# Patient Record
Sex: FEMALE | Race: WHITE | NOT HISPANIC OR LATINO | URBAN - METROPOLITAN AREA
[De-identification: names, ages, dates, MRNs, and addresses within clinical notes are randomized per-mention and may not be internally consistent; named-entity substitution may affect disease eponyms.]

---

## 2017-07-25 ENCOUNTER — EMERGENCY (EMERGENCY)
Facility: HOSPITAL | Age: 52
LOS: 0 days | Discharge: HOME | End: 2017-07-25
Attending: EMERGENCY MEDICINE | Admitting: EMERGENCY MEDICINE
Payer: COMMERCIAL

## 2017-07-25 VITALS — WEIGHT: 145.06 LBS | HEIGHT: 70 IN

## 2017-07-25 VITALS
HEART RATE: 75 BPM | OXYGEN SATURATION: 100 % | TEMPERATURE: 98 F | RESPIRATION RATE: 18 BRPM | DIASTOLIC BLOOD PRESSURE: 70 MMHG | SYSTOLIC BLOOD PRESSURE: 114 MMHG

## 2017-07-25 DIAGNOSIS — S09.93XA UNSPECIFIED INJURY OF FACE, INITIAL ENCOUNTER: ICD-10-CM
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
## 2017-07-25 PROCEDURE — 99285 EMERGENCY DEPT VISIT HI MDM: CPT

## 2017-07-25 RX ORDER — IBUPROFEN 200 MG
600 TABLET ORAL ONCE
Qty: 0 | Refills: 0 | Status: COMPLETED | OUTPATIENT
Start: 2017-07-25 | End: 2017-07-25

## 2017-07-25 RX ADMIN — Medication 600 MILLIGRAM(S): at 21:43
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
## 2017-07-25 NOTE — ED STATDOCS - MEDICAL DECISION MAKING DETAILS
Pt presents s/p injury to face c/o HA and neck pain, likely with mild concussion. Will give meds and d/c home. Encourage rest and hydration. Pt presents s/p injury to face c/o HA and neck pain, likely with mild concussion. Will give meds and d/c home. Encourage rest and hydration. valium for neck spasm and motrin for headache

## 2017-07-25 NOTE — ED STATDOCS - CARE PLAN
Principal Discharge DX:	Strain of neck muscle, initial encounter  Secondary Diagnosis:	Head injury due to trauma, initial encounter

## 2017-07-25 NOTE — ED ADULT TRIAGE NOTE - CHIEF COMPLAINT QUOTE
pt was in the store looking at [employer] umbrellas and it fell and hit her head. pt c/o of slight nausea and headache. denies dizziness/vomiting.

## 2017-07-25 NOTE — ED ADULT NURSE NOTE - CHIEF COMPLAINT QUOTE
pt was in the store looking at [employer] umbrellas and it fell and hit her head. pt c/o of slight nausea and headache. denies dizziness/vomiting.

## 2018-07-23 ENCOUNTER — EMERGENCY (EMERGENCY)
Facility: HOSPITAL | Age: 53
LOS: 0 days | Discharge: HOME | End: 2018-07-23
Attending: EMERGENCY MEDICINE | Admitting: EMERGENCY MEDICINE
Payer: COMMERCIAL

## 2018-07-23 VITALS — HEIGHT: 70 IN | WEIGHT: 145.06 LBS

## 2018-07-23 VITALS
HEART RATE: 86 BPM | SYSTOLIC BLOOD PRESSURE: 124 MMHG | RESPIRATION RATE: 18 BRPM | OXYGEN SATURATION: 100 % | DIASTOLIC BLOOD PRESSURE: 91 MMHG | TEMPERATURE: 98 F

## 2018-07-23 DIAGNOSIS — S06.0X9A CONCUSSION WITH LOSS OF CONSCIOUSNESS OF UNSPECIFIED DURATION, INITIAL ENCOUNTER: ICD-10-CM
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
## 2018-07-23 DIAGNOSIS — S09.90XA UNSPECIFIED INJURY OF HEAD, INITIAL ENCOUNTER: ICD-10-CM

## 2018-07-23 PROCEDURE — 70450 CT HEAD/BRAIN W/O DYE: CPT | Mod: 26

## 2018-07-23 PROCEDURE — 72125 CT NECK SPINE W/O DYE: CPT | Mod: 26

## 2018-07-23 PROCEDURE — 99284 EMERGENCY DEPT VISIT MOD MDM: CPT

## 2018-07-23 NOTE — ED STATDOCS - CARE PLAN
Principal Discharge DX:	Closed head injury, initial encounter  Secondary Diagnosis:	Concussion with loss of consciousness, initial encounter
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
## 2018-07-23 NOTE — ED STATDOCS - NS_ ATTENDINGSCRIBEDETAILS _ED_A_ED_FT
I Dennis Flannery MD saw and examined the patient. Scribe documented for me and under my supervision. I have modified the scribe's documentation where necessary to reflect my history, physical exam and other relevant documentations pertinent to the care of the patient.

## 2018-07-23 NOTE — ED STATDOCS - ATTENDING CONTRIBUTION TO CARE
I Dennis Flannery MD saw and examined the patient. MLP saw and examined the patient under my supervision. I discussed the care of the patient with MLP and agree with MLP's plan, assessment and care of the patient while in the ED.

## 2018-07-23 NOTE — ED STATDOCS - MUSCULOSKELETAL, MLM
range of motion is not limited and there is no muscle tenderness. range of motion is not limited and there is no muscle tenderness. NIH=0 GCS=15. FROM x 4.

## 2018-07-23 NOTE — ED STATDOCS - MEDICAL DECISION MAKING DETAILS
51 y/o female presents with head injury today, plan for CT c-spine and head. Pt refuses pain medication at this time. 51 y/o female presents with head injury today, normal neuro exam. Plan for CT c-spine and head. Pt refuses pain medication at this time.

## 2018-07-23 NOTE — ED STATDOCS - DIAGNOSIS COUNSELING, MDM
conducted a detailed discussion... Patient told to follow up with PMD, given education about concussion and educated about symptoms and the need to return immediately if unable to see concussion program, or neurology or if any health concerns.

## 2018-07-23 NOTE — ED STATDOCS - GASTROINTESTINAL, MLM
abdomen soft, non-tender, and non-distended. Bowel sounds present. abdomen soft, non-tender, and non-distended. Bowel sounds present. BS+/ 4 quadrants.

## 2018-07-23 NOTE — ED STATDOCS - NEUROLOGICAL, MLM
sensation is normal and strength is normal. sensation is normal and strength is normal. 5/5 strength all extremities. Cranial nerves ii-vii intact. Finger to nose test normal. sensation is normal and strength is normal. 5/5 strength all extremities. Cranial nerves ii-xii intact. Finger to nose test normal.

## 2019-12-24 ENCOUNTER — EMERGENCY (EMERGENCY)
Facility: HOSPITAL | Age: 54
LOS: 0 days | Discharge: HOME | End: 2019-12-25
Attending: EMERGENCY MEDICINE
Payer: COMMERCIAL

## 2019-12-24 VITALS
HEIGHT: 70 IN | HEART RATE: 88 BPM | RESPIRATION RATE: 18 BRPM | WEIGHT: 145.06 LBS | OXYGEN SATURATION: 100 % | SYSTOLIC BLOOD PRESSURE: 108 MMHG | DIASTOLIC BLOOD PRESSURE: 68 MMHG

## 2019-12-24 DIAGNOSIS — R11.2 NAUSEA WITH VOMITING, UNSPECIFIED: ICD-10-CM

## 2019-12-24 DIAGNOSIS — K52.9 NONINFECTIVE GASTROENTERITIS AND COLITIS, UNSPECIFIED: ICD-10-CM

## 2019-12-24 DIAGNOSIS — R10.31 RIGHT LOWER QUADRANT PAIN: ICD-10-CM

## 2019-12-24 DIAGNOSIS — R19.7 DIARRHEA, UNSPECIFIED: ICD-10-CM

## 2019-12-24 LAB
ALBUMIN SERPL ELPH-MCNC: 4.2 G/DL — SIGNIFICANT CHANGE UP (ref 3.3–5)
ALP SERPL-CCNC: 87 U/L — SIGNIFICANT CHANGE UP (ref 40–120)
ALT FLD-CCNC: 22 U/L — SIGNIFICANT CHANGE UP (ref 12–78)
ANION GAP SERPL CALC-SCNC: 5 MMOL/L — SIGNIFICANT CHANGE UP (ref 5–17)
APPEARANCE UR: CLEAR — SIGNIFICANT CHANGE UP
AST SERPL-CCNC: 19 U/L — SIGNIFICANT CHANGE UP (ref 15–37)
BASOPHILS # BLD AUTO: 0.03 K/UL — SIGNIFICANT CHANGE UP (ref 0–0.2)
BASOPHILS NFR BLD AUTO: 0.2 % — SIGNIFICANT CHANGE UP (ref 0–2)
BILIRUB SERPL-MCNC: 0.6 MG/DL — SIGNIFICANT CHANGE UP (ref 0.2–1.2)
BILIRUB UR-MCNC: NEGATIVE — SIGNIFICANT CHANGE UP
BUN SERPL-MCNC: 17 MG/DL — SIGNIFICANT CHANGE UP (ref 7–23)
CALCIUM SERPL-MCNC: 9.3 MG/DL — SIGNIFICANT CHANGE UP (ref 8.5–10.1)
CHLORIDE SERPL-SCNC: 104 MMOL/L — SIGNIFICANT CHANGE UP (ref 96–108)
CO2 SERPL-SCNC: 28 MMOL/L — SIGNIFICANT CHANGE UP (ref 22–31)
COLOR SPEC: YELLOW — SIGNIFICANT CHANGE UP
CREAT SERPL-MCNC: 0.88 MG/DL — SIGNIFICANT CHANGE UP (ref 0.5–1.3)
DIFF PNL FLD: NEGATIVE — SIGNIFICANT CHANGE UP
EOSINOPHIL # BLD AUTO: 0 K/UL — SIGNIFICANT CHANGE UP (ref 0–0.5)
EOSINOPHIL NFR BLD AUTO: 0 % — SIGNIFICANT CHANGE UP (ref 0–6)
GLUCOSE SERPL-MCNC: 177 MG/DL — HIGH (ref 70–99)
GLUCOSE UR QL: NEGATIVE MG/DL — SIGNIFICANT CHANGE UP
HCT VFR BLD CALC: 44.1 % — SIGNIFICANT CHANGE UP (ref 34.5–45)
HGB BLD-MCNC: 15 G/DL — SIGNIFICANT CHANGE UP (ref 11.5–15.5)
IMM GRANULOCYTES NFR BLD AUTO: 0.3 % — SIGNIFICANT CHANGE UP (ref 0–1.5)
KETONES UR-MCNC: ABNORMAL
LEUKOCYTE ESTERASE UR-ACNC: ABNORMAL
LIDOCAIN IGE QN: 148 U/L — SIGNIFICANT CHANGE UP (ref 73–393)
LYMPHOCYTES # BLD AUTO: 0.59 K/UL — LOW (ref 1–3.3)
LYMPHOCYTES # BLD AUTO: 4.3 % — LOW (ref 13–44)
MCHC RBC-ENTMCNC: 30.4 PG — SIGNIFICANT CHANGE UP (ref 27–34)
MCHC RBC-ENTMCNC: 34 GM/DL — SIGNIFICANT CHANGE UP (ref 32–36)
MCV RBC AUTO: 89.3 FL — SIGNIFICANT CHANGE UP (ref 80–100)
MONOCYTES # BLD AUTO: 0.62 K/UL — SIGNIFICANT CHANGE UP (ref 0–0.9)
MONOCYTES NFR BLD AUTO: 4.5 % — SIGNIFICANT CHANGE UP (ref 2–14)
NEUTROPHILS # BLD AUTO: 12.37 K/UL — HIGH (ref 1.8–7.4)
NEUTROPHILS NFR BLD AUTO: 90.7 % — HIGH (ref 43–77)
NITRITE UR-MCNC: NEGATIVE — SIGNIFICANT CHANGE UP
PH UR: 5 — SIGNIFICANT CHANGE UP (ref 5–8)
PLATELET # BLD AUTO: 215 K/UL — SIGNIFICANT CHANGE UP (ref 150–400)
POTASSIUM SERPL-MCNC: 4.1 MMOL/L — SIGNIFICANT CHANGE UP (ref 3.5–5.3)
POTASSIUM SERPL-SCNC: 4.1 MMOL/L — SIGNIFICANT CHANGE UP (ref 3.5–5.3)
PROT SERPL-MCNC: 7.8 GM/DL — SIGNIFICANT CHANGE UP (ref 6–8.3)
PROT UR-MCNC: 15 MG/DL
RBC # BLD: 4.94 M/UL — SIGNIFICANT CHANGE UP (ref 3.8–5.2)
RBC # FLD: 12.4 % — SIGNIFICANT CHANGE UP (ref 10.3–14.5)
SODIUM SERPL-SCNC: 137 MMOL/L — SIGNIFICANT CHANGE UP (ref 135–145)
SP GR SPEC: 1.02 — SIGNIFICANT CHANGE UP (ref 1.01–1.02)
UROBILINOGEN FLD QL: NEGATIVE MG/DL — SIGNIFICANT CHANGE UP
WBC # BLD: 13.65 K/UL — HIGH (ref 3.8–10.5)
WBC # FLD AUTO: 13.65 K/UL — HIGH (ref 3.8–10.5)

## 2019-12-24 PROCEDURE — 86901 BLOOD TYPING SEROLOGIC RH(D): CPT

## 2019-12-24 PROCEDURE — 96374 THER/PROPH/DIAG INJ IV PUSH: CPT | Mod: XU

## 2019-12-24 PROCEDURE — 81001 URINALYSIS AUTO W/SCOPE: CPT

## 2019-12-24 PROCEDURE — 86900 BLOOD TYPING SEROLOGIC ABO: CPT

## 2019-12-24 PROCEDURE — 74177 CT ABD & PELVIS W/CONTRAST: CPT | Mod: 26

## 2019-12-24 PROCEDURE — 83690 ASSAY OF LIPASE: CPT

## 2019-12-24 PROCEDURE — 36415 COLL VENOUS BLD VENIPUNCTURE: CPT

## 2019-12-24 PROCEDURE — 99284 EMERGENCY DEPT VISIT MOD MDM: CPT | Mod: 25

## 2019-12-24 PROCEDURE — 86850 RBC ANTIBODY SCREEN: CPT

## 2019-12-24 PROCEDURE — 99284 EMERGENCY DEPT VISIT MOD MDM: CPT

## 2019-12-24 PROCEDURE — 87086 URINE CULTURE/COLONY COUNT: CPT

## 2019-12-24 PROCEDURE — 80053 COMPREHEN METABOLIC PANEL: CPT

## 2019-12-24 PROCEDURE — 85025 COMPLETE CBC W/AUTO DIFF WBC: CPT

## 2019-12-24 PROCEDURE — 96361 HYDRATE IV INFUSION ADD-ON: CPT

## 2019-12-24 PROCEDURE — 74177 CT ABD & PELVIS W/CONTRAST: CPT

## 2019-12-24 RX ORDER — FAMOTIDINE 10 MG/ML
20 INJECTION INTRAVENOUS ONCE
Refills: 0 | Status: COMPLETED | OUTPATIENT
Start: 2019-12-24 | End: 2019-12-24

## 2019-12-24 RX ORDER — ONDANSETRON 8 MG/1
4 TABLET, FILM COATED ORAL ONCE
Refills: 0 | Status: COMPLETED | OUTPATIENT
Start: 2019-12-24 | End: 2019-12-24

## 2019-12-24 RX ORDER — SODIUM CHLORIDE 9 MG/ML
1000 INJECTION INTRAMUSCULAR; INTRAVENOUS; SUBCUTANEOUS ONCE
Refills: 0 | Status: COMPLETED | OUTPATIENT
Start: 2019-12-24 | End: 2019-12-24

## 2019-12-24 RX ADMIN — SODIUM CHLORIDE 1000 MILLILITER(S): 9 INJECTION INTRAMUSCULAR; INTRAVENOUS; SUBCUTANEOUS at 22:12

## 2019-12-24 RX ADMIN — SODIUM CHLORIDE 1000 MILLILITER(S): 9 INJECTION INTRAMUSCULAR; INTRAVENOUS; SUBCUTANEOUS at 21:12

## 2019-12-24 NOTE — ED STATDOCS - PATIENT PORTAL LINK FT
You can access the FollowMyHealth Patient Portal offered by [facility] by registering at the following website: [email or web address]. By joining [employer]’s FollowMyHealth portal, you will also be able to view your health information using other applications (apps) compatible with our system.

## 2019-12-24 NOTE — ED STATDOCS - OBJECTIVE STATEMENT
53 y/o female with no significant PMHx presents to the ED c/o RLQ abd pain beginning today afternoon. +N/V/D. Reports that stool is very watery. No other acute complaints.

## 2019-12-24 NOTE — ED STATDOCS - ATTENDING CONTRIBUTION TO CARE
Attending Contribution to Care: I, Cara Ortega, performed the initial face to face bedside interview with this patient regarding history of present illness, review of symptoms and relevant past medical, social and family history.  I completed an independent physical examination.  I was the initial provider who evaluated this patient and the history, physical, and MDM reflect this intial assessment. I have signed out the follow up of any pending tests after the original (i.e. labs, radiological studies) to the ACP with instructions to review any with instructions to review any concerning findings to me prior to discharge.  I have communicated the patient’s plan of care and disposition with the ACP.

## 2019-12-24 NOTE — ED STATDOCS - PHYSICAL EXAMINATION
PA NOTE: GEN: AOX3, NAD. HEENT: Throat clear. Airway is patent. EYES: PERRLA. EOMI. Head: NC/AT. NECK: Supple, No JVD. FROM. C-spine non-tender. CV:S1S2, RRR, LUNGS: CTA b/l, no w/r/r. CHEST: Equal chest expansion and rise. No deformity. ABD: Soft, NT/ND, no rebound, no guarding. No CVAT. NEG Fenton. NEG McBurney's point tenderness. EXT: No e/c/c. 2+ distal pulses. SKIN: No rashes. NEURO: No focal deficits. CN II-XII intact. FROM. 5/5 motor and sensory. ANGELINA Mary

## 2019-12-24 NOTE — ED STATDOCS - PROGRESS NOTE DETAILS
Patient is a 55 y/o female with no significant PMHx presents to [facility] with c/o sudden onset of n/v/d and abd cramps x past 4 hours. DENIES blood in vomit, rectal bleeding, fever, chills. No sick contacts. No GI history in the past. ~ANGELINA Mary Patient examined and evaluated. Will get labs, CT to r/o acute abd. Patient refused pain meds, nausea meds. ~ANGELINA Mary Waiting for results of CT.  Requesting ice chips. Galdino ROTHMAN ct negative tolerating po fluids will d/c ADELIA Higgins DO

## 2019-12-25 VITALS
SYSTOLIC BLOOD PRESSURE: 118 MMHG | DIASTOLIC BLOOD PRESSURE: 68 MMHG | TEMPERATURE: 99 F | OXYGEN SATURATION: 100 % | RESPIRATION RATE: 17 BRPM | HEART RATE: 67 BPM

## 2019-12-25 RX ORDER — ONDANSETRON 8 MG/1
1 TABLET, FILM COATED ORAL
Qty: 12 | Refills: 0
Start: 2019-12-25 | End: 2019-12-27

## 2019-12-25 RX ORDER — KETOROLAC TROMETHAMINE 30 MG/ML
30 SYRINGE (ML) INJECTION ONCE
Refills: 0 | Status: DISCONTINUED | OUTPATIENT
Start: 2019-12-25 | End: 2019-12-25

## 2019-12-25 RX ADMIN — Medication 30 MILLIGRAM(S): at 00:12

## 2019-12-25 RX ADMIN — Medication 30 MILLIGRAM(S): at 00:33

## 2019-12-26 LAB
CULTURE RESULTS: SIGNIFICANT CHANGE UP
SPECIMEN SOURCE: SIGNIFICANT CHANGE UP

## 2020-07-27 ENCOUNTER — EMERGENCY (EMERGENCY)
Facility: HOSPITAL | Age: 55
LOS: 0 days | Discharge: HOME | End: 2020-07-27
Payer: COMMERCIAL

## 2020-07-27 VITALS
RESPIRATION RATE: 16 BRPM | TEMPERATURE: 98 F | SYSTOLIC BLOOD PRESSURE: 97 MMHG | DIASTOLIC BLOOD PRESSURE: 64 MMHG | HEART RATE: 72 BPM | OXYGEN SATURATION: 99 %

## 2020-07-27 DIAGNOSIS — Z20.828 CONTACT WITH AND (SUSPECTED) EXPOSURE TO OTHER VIRAL COMMUNICABLE DISEASES: ICD-10-CM

## 2020-07-27 PROCEDURE — U0003: CPT

## 2020-07-27 PROCEDURE — 99283 EMERGENCY DEPT VISIT LOW MDM: CPT

## 2020-07-27 NOTE — ED STATDOCS - NSFOLLOWUPINSTRUCTIONS_ED_ALL_ED_FT
Pt here for COVID-19 testing. Pt non toxic. Pt was swabbed for COVID-19 in their car in drive through area. [facility] will call pt with results. return precautions given. Home self-quarantine recommended. Pt agrees with plan of  care.

## 2020-07-27 NOTE — ED ADULT TRIAGE NOTE - CHIEF COMPLAINT QUOTE
Patient presents for COVID test, Patient was treated, evaluated and discharged by intake provider. Please see provider's notes for assessment.

## 2020-07-27 NOTE — ED STATDOCS - PATIENT PORTAL LINK FT
You can access the FollowMyHealth Patient Portal offered by [facility] by registering at the following website: [email or web address]. By joining [employer]’s FollowMyHealth portal, you will also be able to view your health information using other applications (apps) compatible with our system.

## 2020-07-28 LAB — SARS-COV-2 RNA SPEC QL NAA+PROBE: SIGNIFICANT CHANGE UP

## 2020-08-17 ENCOUNTER — APPOINTMENT (OUTPATIENT)
Dept: OTOLARYNGOLOGY | Facility: CLINIC | Age: 55
End: 2020-08-17

## 2021-01-17 ENCOUNTER — TRANSCRIPTION ENCOUNTER (OUTPATIENT)
Age: 56
End: 2021-01-17

## 2021-01-21 ENCOUNTER — TRANSCRIPTION ENCOUNTER (OUTPATIENT)
Age: 56
End: 2021-01-21

## 2021-01-23 ENCOUNTER — OUTPATIENT (OUTPATIENT)
Dept: OUTPATIENT SERVICES | Facility: HOSPITAL | Age: 56
LOS: 1 days | End: 2021-01-23
Payer: COMMERCIAL

## 2021-01-23 ENCOUNTER — APPOINTMENT (OUTPATIENT)
Age: 56
End: 2021-01-23
Payer: COMMERCIAL

## 2021-01-23 DIAGNOSIS — Z00.8 ENCOUNTER FOR OTHER GENERAL EXAMINATION: ICD-10-CM

## 2021-01-23 PROCEDURE — 76770 US EXAM ABDO BACK WALL COMP: CPT

## 2021-01-23 PROCEDURE — 76770 US EXAM ABDO BACK WALL COMP: CPT | Mod: 26

## 2021-02-09 ENCOUNTER — APPOINTMENT (OUTPATIENT)
Dept: COLORECTAL SURGERY | Facility: CLINIC | Age: 56
End: 2021-02-09
Payer: COMMERCIAL

## 2021-02-09 VITALS
WEIGHT: 145 LBS | DIASTOLIC BLOOD PRESSURE: 77 MMHG | SYSTOLIC BLOOD PRESSURE: 118 MMHG | RESPIRATION RATE: 14 BRPM | HEIGHT: 70 IN | TEMPERATURE: 98 F | HEART RATE: 91 BPM | BODY MASS INDEX: 20.76 KG/M2

## 2021-02-09 DIAGNOSIS — R10.9 UNSPECIFIED ABDOMINAL PAIN: ICD-10-CM

## 2021-02-09 DIAGNOSIS — K45.8 OTHER SPECIFIED ABDOMINAL HERNIA W/OUT OBSTRUCTION OR GANGRENE: ICD-10-CM

## 2021-02-09 PROCEDURE — 99072 ADDL SUPL MATRL&STAF TM PHE: CPT

## 2021-02-09 PROCEDURE — 99243 OFF/OP CNSLTJ NEW/EST LOW 30: CPT

## 2021-02-13 ENCOUNTER — APPOINTMENT (OUTPATIENT)
Dept: CT IMAGING | Facility: CLINIC | Age: 56
End: 2021-02-13
Payer: COMMERCIAL

## 2021-02-13 ENCOUNTER — OUTPATIENT (OUTPATIENT)
Dept: OUTPATIENT SERVICES | Facility: HOSPITAL | Age: 56
LOS: 1 days | End: 2021-02-13
Payer: COMMERCIAL

## 2021-02-13 DIAGNOSIS — R10.9 UNSPECIFIED ABDOMINAL PAIN: ICD-10-CM

## 2021-02-13 PROCEDURE — 74177 CT ABD & PELVIS W/CONTRAST: CPT | Mod: 26

## 2021-02-13 PROCEDURE — 74177 CT ABD & PELVIS W/CONTRAST: CPT

## 2021-03-16 ENCOUNTER — TRANSCRIPTION ENCOUNTER (OUTPATIENT)
Age: 56
End: 2021-03-16

## 2021-05-28 ENCOUNTER — APPOINTMENT (OUTPATIENT)
Dept: FAMILY MEDICINE | Facility: CLINIC | Age: 56
End: 2021-05-28

## 2021-06-04 NOTE — ADDENDUM
[FreeTextEntry1] : 6/4/21 - On 2/9/21 patient presented to the office for consultation for recurrent abdominal hernia.  She was advised to cancel her surgery scheduled on 2/19/21 in order to have evaluate this hernia; workup included CT A/P. If a hernia is present, then this would have to be corrected before any other surgery. The CT A/P results showed no evidence of hernia, therefore, no further intervention required from colorectal surgery.

## 2021-06-04 NOTE — PHYSICAL EXAM
[Normal Breath Sounds] : Normal breath sounds [Normal Heart Sounds] : normal heart sounds [Normal Rate and Rhythm] : normal rate and rhythm [No Rash or Lesion] : No rash or lesion [Alert] : alert [Oriented to Person] : oriented to person [Oriented to Time] : oriented to time [Oriented to Place] : oriented to place [Calm] : calm [Abdomen Masses] : No abdominal masses [Abdomen Tenderness] : ~T No ~M abdominal tenderness [Tender] : nontender [JVD] : no jugular venous distention  [de-identified] : Left abdominal wall bulge consistent with hernia [de-identified] : Looks well in no distress, of stated age. [de-identified] : pupils equal reactive to light normocephalic atraumatic. [de-identified] : moves all 4 extremities appropriately with 5 over 5 strength

## 2021-06-04 NOTE — CONSULT LETTER
[Dear  ___] : Dear  [unfilled], [Consult Letter:] : I had the pleasure of evaluating your patient, [unfilled]. [Please see my note below.] : Please see my note below. [Consult Closing:] : Thank you very much for allowing me to participate in the care of this patient.  If you have any questions, please do not hesitate to contact me. [Sincerely,] : Sincerely, [FreeTextEntry3] : Ion Mcdermott M.D. FACS, FASCRS

## 2021-06-04 NOTE — REVIEW OF SYSTEMS
[As Noted in HPI] : as noted in HPI [Negative] : Heme/Lymph [FreeTextEntry7] : Abdominal wall bulge left side

## 2021-06-04 NOTE — HISTORY OF PRESENT ILLNESS
[FreeTextEntry1] : Consultation from Dr. Zuleika Burton for recurrent abdominal hernia. 55-year-old female with multiple hernia repairs in the past with no complaints of left abdominal wall bulging. Symptoms have been present for several months of worsening associated with some slight abdominal pain denies any nausea or vomiting

## 2021-06-04 NOTE — ASSESSMENT
[FreeTextEntry1] : 55-year-old female with left abdominal wall buldging consistent with recurrent abdominal wall hernia. Recommend CAT scan abdomen and pelvis to better visualize possible hernia if hernia is present will require surgical repair with mesh.Risk and benefits of the surgery have been discussed which include bleeding, infection, sepsis, multiorgan failure, inadvertent injury including hollow viscus, solid organ, ureter neurovascular and neurological structures, DVT PE, heart attack, stroke, hernias, recurrence of tumor and death.

## 2021-07-20 ENCOUNTER — NON-APPOINTMENT (OUTPATIENT)
Age: 56
End: 2021-07-20

## 2021-08-09 NOTE — ED ADULT NURSE NOTE - NS ED NURSE LEVEL OF CONSCIOUSNESS ORIENTATION
Called pt to discuss benefits of cardiac rehab, no answer, left message on v/m for pt to return my call. Pt has not had a follow up visit with cardiology.   Oriented - self; Oriented - place; Oriented - time

## 2021-08-29 PROBLEM — Z87.442 HISTORY OF KIDNEY STONES: Status: RESOLVED | Noted: 2021-08-29 | Resolved: 2021-08-29

## 2021-08-29 PROBLEM — Z86.010 H/O ADENOMATOUS POLYP OF COLON: Status: RESOLVED | Noted: 2021-08-29 | Resolved: 2021-08-29

## 2021-08-29 PROBLEM — Z82.69 FAMILY HISTORY OF OSTEOARTHRITIS: Status: ACTIVE | Noted: 2021-08-29

## 2021-08-29 PROBLEM — Z82.49 FAMILY HISTORY OF HYPERTENSION: Status: ACTIVE | Noted: 2021-08-29

## 2021-08-29 PROBLEM — Z83.438 FAMILY HISTORY OF HYPERLIPIDEMIA: Status: ACTIVE | Noted: 2021-08-29

## 2021-09-01 ENCOUNTER — APPOINTMENT (OUTPATIENT)
Dept: FAMILY MEDICINE | Facility: CLINIC | Age: 56
End: 2021-09-01
Payer: COMMERCIAL

## 2021-09-01 VITALS
SYSTOLIC BLOOD PRESSURE: 110 MMHG | BODY MASS INDEX: 21.19 KG/M2 | OXYGEN SATURATION: 98 % | DIASTOLIC BLOOD PRESSURE: 60 MMHG | WEIGHT: 148 LBS | HEIGHT: 70 IN | TEMPERATURE: 98.6 F | HEART RATE: 81 BPM

## 2021-09-01 DIAGNOSIS — F41.8 OTHER SPECIFIED ANXIETY DISORDERS: ICD-10-CM

## 2021-09-01 DIAGNOSIS — Z86.010 PERSONAL HISTORY OF COLONIC POLYPS: ICD-10-CM

## 2021-09-01 DIAGNOSIS — Z00.00 ENCOUNTER FOR GENERAL ADULT MEDICAL EXAMINATION W/OUT ABNORMAL FINDINGS: ICD-10-CM

## 2021-09-01 DIAGNOSIS — Z87.891 PERSONAL HISTORY OF NICOTINE DEPENDENCE: ICD-10-CM

## 2021-09-01 DIAGNOSIS — Z82.49 FAMILY HISTORY OF ISCHEMIC HEART DISEASE AND OTHER DISEASES OF THE CIRCULATORY SYSTEM: ICD-10-CM

## 2021-09-01 DIAGNOSIS — Z78.9 OTHER SPECIFIED HEALTH STATUS: ICD-10-CM

## 2021-09-01 DIAGNOSIS — Z83.438 FAMILY HISTORY OF OTHER DISORDER OF LIPOPROTEIN METABOLISM AND OTHER LIPIDEMIA: ICD-10-CM

## 2021-09-01 DIAGNOSIS — G43.909 MIGRAINE, UNSPECIFIED, NOT INTRACTABLE, W/OUT STATUS MIGRAINOSUS: ICD-10-CM

## 2021-09-01 DIAGNOSIS — Z87.442 PERSONAL HISTORY OF URINARY CALCULI: ICD-10-CM

## 2021-09-01 DIAGNOSIS — D05.00 LOBULAR CARCINOMA IN SITU OF UNSPECIFIED BREAST: ICD-10-CM

## 2021-09-01 DIAGNOSIS — Z82.69 FAMILY HISTORY OF OTHER DISEASES OF THE MUSCULOSKELETAL SYSTEM AND CONNECTIVE TISSUE: ICD-10-CM

## 2021-09-01 PROCEDURE — 99396 PREV VISIT EST AGE 40-64: CPT

## 2021-09-01 RX ORDER — ALPRAZOLAM 0.5 MG/1
0.5 TABLET ORAL
Qty: 30 | Refills: 0 | Status: ACTIVE | COMMUNITY
Start: 1900-01-01 | End: 1900-01-01

## 2021-09-01 NOTE — PLAN
[FreeTextEntry1] : Reviewed age-appropriate preventive screening tests with patient. UTD on mammo and colonoscopy although she is not sure if she is due at 3 or 5 yrs so she will check with GI to see. Due for gyn exam and she will sched for near future.\par  \par Shingrix #2 and flu vacc revd and recommended. School starts tomorrow so she defers on Shingrix #2 today but she has a 5 day weekend from school this coming weekend so will get vaccine over that weekend (Rx sent to [facility]). She defers on flu vacc for now. \par \par Cont clean eating and regular exercise/staying as physically active as possible.\par \par Reviewed importance of good self care (eg meditation, yoga, adequate rest, regular exercise, magnesium, clean eating etc). r/b/se Alprazolam revd and she will cont to limit use as much as possible. RF sent. \par \par RF Imitrex for prn use, stay well hydrated, clean eating, stress reduction measures for migraines. \par \par She will get a copy of labs from Dr. Leary's office sent here for review. She defers on need for any other labs at this time (lipids in the past have been fabulous) but if she changes her mind and wishes to do labs at any point she can let me know. \par \par Next PE in 1-2 yrs.

## 2021-09-01 NOTE — PHYSICAL EXAM
[No Acute Distress] : no acute distress [Well Nourished] : well nourished [Well Developed] : well developed [Well-Appearing] : well-appearing [Normal Sclera/Conjunctiva] : normal sclera/conjunctiva [EOMI] : extraocular movements intact [No JVD] : no jugular venous distention [No Lymphadenopathy] : no lymphadenopathy [Supple] : supple [Thyroid Normal, No Nodules] : the thyroid was normal and there were no nodules present [No Respiratory Distress] : no respiratory distress  [No Accessory Muscle Use] : no accessory muscle use [Clear to Auscultation] : lungs were clear to auscultation bilaterally [Normal Rate] : normal rate  [Regular Rhythm] : with a regular rhythm [Normal S1, S2] : normal S1 and S2 [No Murmur] : no murmur heard [No Carotid Bruits] : no carotid bruits [No Varicosities] : no varicosities [Pedal Pulses Present] : the pedal pulses are present [No Edema] : there was no peripheral edema [No Extremity Clubbing/Cyanosis] : no extremity clubbing/cyanosis [Soft] : abdomen soft [Non Tender] : non-tender [Non-distended] : non-distended [No Masses] : no abdominal mass palpated [No HSM] : no HSM [Normal Bowel Sounds] : normal bowel sounds [Normal Posterior Cervical Nodes] : no posterior cervical lymphadenopathy [Normal Anterior Cervical Nodes] : no anterior cervical lymphadenopathy [No Joint Swelling] : no joint swelling [Grossly Normal Strength/Tone] : grossly normal strength/tone [No Rash] : no rash [Coordination Grossly Intact] : coordination grossly intact [No Focal Deficits] : no focal deficits [Normal Gait] : normal gait [Normal Affect] : the affect was normal [Normal Insight/Judgement] : insight and judgment were intact [de-identified] : slender frame

## 2021-09-01 NOTE — HISTORY OF PRESENT ILLNESS
[de-identified] : Her last PE was 5/2020\par \par Her last tetanus shot was 2016 at urgent care\par Shingrix 11/2020 and is due for second dose .\par She had COVID Moderna vaccine series. \par \par Her diet is clean/healthful overall\par Exercises regularly\par \par Her last colonoscopy was 11/2018, rpt due 5 yrs (Dr. Anderson)\par Her last mammogram was Summer 2021\par Her last gyn exam was 1/2020 (Dr. Chaney) and she plans to sched for near future.\par \par She also has h/o LCIS right breast cancer in 2009 and migraine HA. \par \par Saw Dr. Leary Summer 2021 and had extensive labs for fatigue, achiness and all was wnl. She will get those labs sent here and she defers on need for any labs today. \par \par She finds sumatriptan to be very helpful when needed and needs RF. She also has Alprazolam on hand for prn use for situational anxiety and would like RF; she knows to limit use of this med and does so. Has high stress in life as is living with her elderly mom who requires care/oversight etc; she has supportive /family/friends and gets out to exercise/get time to herself.

## 2021-09-01 NOTE — REVIEW OF SYSTEMS
[Negative] : Heme/Lymph [Headache] : headache [Anxiety] : anxiety [Depression] : no depression [de-identified] : occas migraine HA, uses sumatriptan and is effective when needed [de-identified] : situational anxiety, uses Alprazolam sparingly with good effect

## 2021-09-08 ENCOUNTER — APPOINTMENT (OUTPATIENT)
Dept: FAMILY MEDICINE | Facility: CLINIC | Age: 56
End: 2021-09-08

## 2021-10-25 ENCOUNTER — EMERGENCY (EMERGENCY)
Facility: HOSPITAL | Age: 56
LOS: 0 days | Discharge: HOME | End: 2021-10-25
Attending: EMERGENCY MEDICINE
Payer: COMMERCIAL

## 2021-10-25 ENCOUNTER — TRANSCRIPTION ENCOUNTER (OUTPATIENT)
Age: 56
End: 2021-10-25

## 2021-10-25 VITALS
HEART RATE: 85 BPM | SYSTOLIC BLOOD PRESSURE: 115 MMHG | TEMPERATURE: 98 F | OXYGEN SATURATION: 100 % | DIASTOLIC BLOOD PRESSURE: 72 MMHG | RESPIRATION RATE: 18 BRPM

## 2021-10-25 VITALS
TEMPERATURE: 98 F | HEIGHT: 70 IN | OXYGEN SATURATION: 98 % | WEIGHT: 113.98 LBS | RESPIRATION RATE: 18 BRPM | SYSTOLIC BLOOD PRESSURE: 108 MMHG | HEART RATE: 87 BPM | DIASTOLIC BLOOD PRESSURE: 74 MMHG

## 2021-10-25 DIAGNOSIS — R11.0 NAUSEA: ICD-10-CM

## 2021-10-25 DIAGNOSIS — R50.9 FEVER, UNSPECIFIED: ICD-10-CM

## 2021-10-25 DIAGNOSIS — R10.31 RIGHT LOWER QUADRANT PAIN: ICD-10-CM

## 2021-10-25 DIAGNOSIS — N39.0 URINARY TRACT INFECTION, SITE NOT SPECIFIED: ICD-10-CM

## 2021-10-25 LAB
ALBUMIN SERPL ELPH-MCNC: 3.8 G/DL — SIGNIFICANT CHANGE UP (ref 3.3–5)
ALP SERPL-CCNC: 81 U/L — SIGNIFICANT CHANGE UP (ref 40–120)
ALT FLD-CCNC: 22 U/L — SIGNIFICANT CHANGE UP (ref 12–78)
ANION GAP SERPL CALC-SCNC: 3 MMOL/L — LOW (ref 5–17)
APPEARANCE UR: CLEAR — SIGNIFICANT CHANGE UP
APTT BLD: 34 SEC — SIGNIFICANT CHANGE UP (ref 27.5–35.5)
AST SERPL-CCNC: 16 U/L — SIGNIFICANT CHANGE UP (ref 15–37)
BACTERIA # UR AUTO: ABNORMAL
BASOPHILS # BLD AUTO: 0.02 K/UL — SIGNIFICANT CHANGE UP (ref 0–0.2)
BASOPHILS NFR BLD AUTO: 0.4 % — SIGNIFICANT CHANGE UP (ref 0–2)
BILIRUB SERPL-MCNC: 0.3 MG/DL — SIGNIFICANT CHANGE UP (ref 0.2–1.2)
BILIRUB UR-MCNC: NEGATIVE — SIGNIFICANT CHANGE UP
BLD GP AB SCN SERPL QL: SIGNIFICANT CHANGE UP
BUN SERPL-MCNC: 14 MG/DL — SIGNIFICANT CHANGE UP (ref 7–23)
CALCIUM SERPL-MCNC: 9.1 MG/DL — SIGNIFICANT CHANGE UP (ref 8.5–10.1)
CHLORIDE SERPL-SCNC: 107 MMOL/L — SIGNIFICANT CHANGE UP (ref 96–108)
CO2 SERPL-SCNC: 29 MMOL/L — SIGNIFICANT CHANGE UP (ref 22–31)
COLOR SPEC: YELLOW — SIGNIFICANT CHANGE UP
CREAT SERPL-MCNC: 0.73 MG/DL — SIGNIFICANT CHANGE UP (ref 0.5–1.3)
DIFF PNL FLD: ABNORMAL
EOSINOPHIL # BLD AUTO: 0 K/UL — SIGNIFICANT CHANGE UP (ref 0–0.5)
EOSINOPHIL NFR BLD AUTO: 0 % — SIGNIFICANT CHANGE UP (ref 0–6)
EPI CELLS # UR: SIGNIFICANT CHANGE UP
GLUCOSE SERPL-MCNC: 93 MG/DL — SIGNIFICANT CHANGE UP (ref 70–99)
GLUCOSE UR QL: NEGATIVE MG/DL — SIGNIFICANT CHANGE UP
HCT VFR BLD CALC: 39.4 % — SIGNIFICANT CHANGE UP (ref 34.5–45)
HGB BLD-MCNC: 13.4 G/DL — SIGNIFICANT CHANGE UP (ref 11.5–15.5)
IMM GRANULOCYTES NFR BLD AUTO: 0.2 % — SIGNIFICANT CHANGE UP (ref 0–1.5)
INR BLD: 1.02 RATIO — SIGNIFICANT CHANGE UP (ref 0.88–1.16)
KETONES UR-MCNC: NEGATIVE — SIGNIFICANT CHANGE UP
LEUKOCYTE ESTERASE UR-ACNC: ABNORMAL
LYMPHOCYTES # BLD AUTO: 1.4 K/UL — SIGNIFICANT CHANGE UP (ref 1–3.3)
LYMPHOCYTES # BLD AUTO: 27.9 % — SIGNIFICANT CHANGE UP (ref 13–44)
MCHC RBC-ENTMCNC: 30.8 PG — SIGNIFICANT CHANGE UP (ref 27–34)
MCHC RBC-ENTMCNC: 34 GM/DL — SIGNIFICANT CHANGE UP (ref 32–36)
MCV RBC AUTO: 90.6 FL — SIGNIFICANT CHANGE UP (ref 80–100)
MONOCYTES # BLD AUTO: 0.62 K/UL — SIGNIFICANT CHANGE UP (ref 0–0.9)
MONOCYTES NFR BLD AUTO: 12.4 % — SIGNIFICANT CHANGE UP (ref 2–14)
NEUTROPHILS # BLD AUTO: 2.96 K/UL — SIGNIFICANT CHANGE UP (ref 1.8–7.4)
NEUTROPHILS NFR BLD AUTO: 59.1 % — SIGNIFICANT CHANGE UP (ref 43–77)
NITRITE UR-MCNC: NEGATIVE — SIGNIFICANT CHANGE UP
PH UR: 6.5 — SIGNIFICANT CHANGE UP (ref 5–8)
PLATELET # BLD AUTO: 206 K/UL — SIGNIFICANT CHANGE UP (ref 150–400)
POTASSIUM SERPL-MCNC: 4.2 MMOL/L — SIGNIFICANT CHANGE UP (ref 3.5–5.3)
POTASSIUM SERPL-SCNC: 4.2 MMOL/L — SIGNIFICANT CHANGE UP (ref 3.5–5.3)
PROT SERPL-MCNC: 7.3 GM/DL — SIGNIFICANT CHANGE UP (ref 6–8.3)
PROT UR-MCNC: NEGATIVE MG/DL — SIGNIFICANT CHANGE UP
PROTHROM AB SERPL-ACNC: 11.9 SEC — SIGNIFICANT CHANGE UP (ref 10.6–13.6)
RBC # BLD: 4.35 M/UL — SIGNIFICANT CHANGE UP (ref 3.8–5.2)
RBC # FLD: 12.4 % — SIGNIFICANT CHANGE UP (ref 10.3–14.5)
RBC CASTS # UR COMP ASSIST: ABNORMAL /HPF (ref 0–4)
SODIUM SERPL-SCNC: 139 MMOL/L — SIGNIFICANT CHANGE UP (ref 135–145)
SP GR SPEC: 1.01 — SIGNIFICANT CHANGE UP (ref 1.01–1.02)
UROBILINOGEN FLD QL: NEGATIVE MG/DL — SIGNIFICANT CHANGE UP
WBC # BLD: 5.01 K/UL — SIGNIFICANT CHANGE UP (ref 3.8–10.5)
WBC # FLD AUTO: 5.01 K/UL — SIGNIFICANT CHANGE UP (ref 3.8–10.5)
WBC UR QL: ABNORMAL

## 2021-10-25 PROCEDURE — 85025 COMPLETE CBC W/AUTO DIFF WBC: CPT

## 2021-10-25 PROCEDURE — 99284 EMERGENCY DEPT VISIT MOD MDM: CPT | Mod: 25

## 2021-10-25 PROCEDURE — 86850 RBC ANTIBODY SCREEN: CPT

## 2021-10-25 PROCEDURE — 85730 THROMBOPLASTIN TIME PARTIAL: CPT

## 2021-10-25 PROCEDURE — 85610 PROTHROMBIN TIME: CPT

## 2021-10-25 PROCEDURE — 80053 COMPREHEN METABOLIC PANEL: CPT

## 2021-10-25 PROCEDURE — 86900 BLOOD TYPING SEROLOGIC ABO: CPT

## 2021-10-25 PROCEDURE — 99285 EMERGENCY DEPT VISIT HI MDM: CPT

## 2021-10-25 PROCEDURE — 81001 URINALYSIS AUTO W/SCOPE: CPT

## 2021-10-25 PROCEDURE — 86901 BLOOD TYPING SEROLOGIC RH(D): CPT

## 2021-10-25 PROCEDURE — 74177 CT ABD & PELVIS W/CONTRAST: CPT | Mod: 26,MA

## 2021-10-25 PROCEDURE — 74177 CT ABD & PELVIS W/CONTRAST: CPT | Mod: MA

## 2021-10-25 PROCEDURE — 87086 URINE CULTURE/COLONY COUNT: CPT

## 2021-10-25 PROCEDURE — 36415 COLL VENOUS BLD VENIPUNCTURE: CPT

## 2021-10-25 RX ORDER — PHENAZOPYRIDINE HCL 100 MG
1 TABLET ORAL
Qty: 6 | Refills: 0
Start: 2021-10-25 | End: 2021-10-27

## 2021-10-25 RX ORDER — CEPHALEXIN 500 MG
1 CAPSULE ORAL
Qty: 21 | Refills: 0
Start: 2021-10-25 | End: 2021-10-31

## 2021-10-25 RX ORDER — IBUPROFEN 200 MG
1 TABLET ORAL
Qty: 10 | Refills: 0
Start: 2021-10-25 | End: 2021-10-29

## 2021-10-25 NOTE — ED STATDOCS - OBJECTIVE STATEMENT
57 y/o female with hx of hernia repair, presents to the ED c/o RLQ abdominal pain x 2 days. +associated fever, chills, nausea, decrease appetite. Pt was seen at Urgent Care and was sent to the ED. Denies dysuria, vomiting. No sick contacts.

## 2021-10-25 NOTE — ED ADULT TRIAGE NOTE - CHIEF COMPLAINT QUOTE
Pt. to the ED C/O RLQ Abdominal Pain with Diarrhea x 2 days + Chills- Pt. sent from  to R/O Appendicitis -- Denies major medical hx

## 2021-10-25 NOTE — ED STATDOCS - PROGRESS NOTE DETAILS
pt refusing pain meds awaiting ct will continue to follow. pt well appearing. -Tara Quezada PA-C pt aware of ct and lab results will treat for UTI and will fu culture results. pt well appearing on dc. -Tara Quezada PA-C

## 2021-10-25 NOTE — ED STATDOCS - PATIENT PORTAL LINK FT
You can access the FollowMyHealth Patient Portal offered by [facility] by registering at the following website: [email or web address]. By joining [employer]’s FollowMyHealth portal, you will also be able to view your health information using other applications (apps) compatible with our system.

## 2021-10-27 LAB
CULTURE RESULTS: SIGNIFICANT CHANGE UP
SPECIMEN SOURCE: SIGNIFICANT CHANGE UP

## 2021-10-28 NOTE — ED POST DISCHARGE NOTE - DETAILS
Left message to return call to ED. Will send letter via [place]. - Jesus Jacobson PA-C pt called, explained contaminated urine culture to pt, pt is asymptomatic Viridiana Wren PA-C

## 2021-12-11 ENCOUNTER — FORM ENCOUNTER (OUTPATIENT)
Age: 56
End: 2021-12-11

## 2021-12-12 ENCOUNTER — TRANSCRIPTION ENCOUNTER (OUTPATIENT)
Age: 56
End: 2021-12-12

## 2021-12-16 ENCOUNTER — OUTPATIENT (OUTPATIENT)
Dept: INPATIENT UNIT | Facility: HOSPITAL | Age: 56
LOS: 1 days | End: 2021-12-16
Payer: MEDICAID

## 2021-12-16 ENCOUNTER — APPOINTMENT (OUTPATIENT)
Dept: DISASTER EMERGENCY | Facility: HOSPITAL | Age: 56
End: 2021-12-16

## 2021-12-16 VITALS
RESPIRATION RATE: 18 BRPM | OXYGEN SATURATION: 97 % | SYSTOLIC BLOOD PRESSURE: 119 MMHG | WEIGHT: 143.08 LBS | HEART RATE: 95 BPM | HEIGHT: 70 IN | DIASTOLIC BLOOD PRESSURE: 83 MMHG | TEMPERATURE: 98 F

## 2021-12-16 VITALS
RESPIRATION RATE: 18 BRPM | DIASTOLIC BLOOD PRESSURE: 85 MMHG | TEMPERATURE: 98 F | OXYGEN SATURATION: 98 % | SYSTOLIC BLOOD PRESSURE: 124 MMHG | HEART RATE: 90 BPM

## 2021-12-16 DIAGNOSIS — U07.1 COVID-19: ICD-10-CM

## 2021-12-16 PROCEDURE — M0243: CPT

## 2021-12-16 RX ORDER — SODIUM CHLORIDE 9 MG/ML
250 INJECTION INTRAMUSCULAR; INTRAVENOUS; SUBCUTANEOUS
Refills: 0 | Status: COMPLETED | OUTPATIENT
Start: 2021-12-16 | End: 2021-12-16

## 2021-12-16 RX ADMIN — SODIUM CHLORIDE 310 MILLILITER(S): 9 INJECTION INTRAMUSCULAR; INTRAVENOUS; SUBCUTANEOUS at 13:45

## 2021-12-16 NOTE — CHART NOTE - NSCHARTNOTEFT_GEN_A_CORE
CC: Monoclonal Antibody Infusion/COVID 19 Positive on 12/12/21      History: Patient presents for infusion of monoclonal antibody infusion. Patient has been screened and was deemed to be a candidate.    Symptoms/ Criteria: fever, cough, ST, malaise, HA, muscle pain    Exposure: unknown    Vaccination Status: Pfizer    Risk Profile includes: LCIS breast cancer          T(C): 36.6 (12-16-21 @ 13:25), Max: 36.6 (12-16-21 @ 13:25)  HR: 95 (12-16-21 @ 13:25) (95 - 95)  BP: 119/83 (12-16-21 @ 13:25) (119/83 - 119/83)  RR: 18 (12-16-21 @ 13:25) (18 - 18)  SpO2: 97% (12-16-21 @ 13:25) (97% - 97%)      PE:   Appearance: NAD	  HEENT:   Normal oral mucosa.   Lymphatic: No lymphadenopathy  Cardiovascular: Normal S1 S2, No JVD, No murmurs, No edema  Respiratory: Lungs clear to auscultation	  Gastrointestinal:  Soft, Non-tender. No guarding   Skin: warm and dry  Neurologic: Non-focal  Extremities: Normal range of motion.     ASSESSMENT:  Pt is a 56y year old Female Covid +  referred to the infusion center for Monoclonal antibody infusion (Regeneron).        PLAN:  - infusion procedure explained to patient   - Consent for monoclonal antibody infusion obtained   - Risk & benefits discussed/all questions answered  - infuse Regeneron over one hour   - will observe patient for one hour post infusion  and then if stable discharge home with outpt follow up as planned by PMD.    POST INFUSION ASSESSMENT:   DISCHARGE at approximately  1515  hours    - Patient tolerated infusion well denies complaints of chest pain/SOB/dizzines/ palps  - VSS for discharge home  - D/C instructions given/ fact sheet included.  - Patient to follow-up with PCP as needed.

## 2021-12-17 ENCOUNTER — TRANSCRIPTION ENCOUNTER (OUTPATIENT)
Age: 56
End: 2021-12-17

## 2022-01-01 NOTE — ED STATDOCS - NS_EDPROVIDERDISPOUSERTYPE_ED_A_ED
[Responds to sound] : responds to sound [Equal movements] : equal movements [Lifts head] : lifts head Scribe Attestation (For Scribes USE Only)... Attending Attestation (For Attendings USE Only).../Scribe Attestation (For Scribes USE Only)...

## 2022-01-20 PROBLEM — Z01.818 PREOP EXAMINATION: Status: ACTIVE | Noted: 2022-01-20

## 2022-01-21 ENCOUNTER — APPOINTMENT (OUTPATIENT)
Dept: FAMILY MEDICINE | Facility: CLINIC | Age: 57
End: 2022-01-21
Payer: COMMERCIAL

## 2022-01-21 ENCOUNTER — NON-APPOINTMENT (OUTPATIENT)
Age: 57
End: 2022-01-21

## 2022-01-21 VITALS
DIASTOLIC BLOOD PRESSURE: 78 MMHG | WEIGHT: 145 LBS | TEMPERATURE: 97.6 F | SYSTOLIC BLOOD PRESSURE: 118 MMHG | HEIGHT: 70 IN | OXYGEN SATURATION: 97 % | HEART RATE: 89 BPM | BODY MASS INDEX: 20.76 KG/M2

## 2022-01-21 DIAGNOSIS — Z01.818 ENCOUNTER FOR OTHER PREPROCEDURAL EXAMINATION: ICD-10-CM

## 2022-01-21 DIAGNOSIS — K46.9 UNSPECIFIED ABDOMINAL HERNIA W/OUT OBSTRUCTION OR GANGRENE: ICD-10-CM

## 2022-01-21 DIAGNOSIS — R05.9 COUGH, UNSPECIFIED: ICD-10-CM

## 2022-01-21 PROCEDURE — 36415 COLL VENOUS BLD VENIPUNCTURE: CPT

## 2022-01-21 PROCEDURE — 93000 ELECTROCARDIOGRAM COMPLETE: CPT

## 2022-01-21 PROCEDURE — 99213 OFFICE O/P EST LOW 20 MIN: CPT | Mod: 25

## 2022-01-22 ENCOUNTER — APPOINTMENT (OUTPATIENT)
Dept: RADIOLOGY | Facility: CLINIC | Age: 57
End: 2022-01-22
Payer: COMMERCIAL

## 2022-01-22 ENCOUNTER — OUTPATIENT (OUTPATIENT)
Dept: OUTPATIENT SERVICES | Facility: HOSPITAL | Age: 57
LOS: 1 days | End: 2022-01-22
Payer: COMMERCIAL

## 2022-01-22 DIAGNOSIS — Z00.8 ENCOUNTER FOR OTHER GENERAL EXAMINATION: ICD-10-CM

## 2022-01-22 LAB
ALBUMIN SERPL ELPH-MCNC: 4.6 G/DL
ALP BLD-CCNC: 98 U/L
ALT SERPL-CCNC: 13 U/L
ANION GAP SERPL CALC-SCNC: 13 MMOL/L
AST SERPL-CCNC: 17 U/L
BILIRUB SERPL-MCNC: 0.2 MG/DL
BUN SERPL-MCNC: 12 MG/DL
CALCIUM SERPL-MCNC: 10.1 MG/DL
CHLORIDE SERPL-SCNC: 100 MMOL/L
CHOLEST SERPL-MCNC: 172 MG/DL
CO2 SERPL-SCNC: 28 MMOL/L
CREAT SERPL-MCNC: 0.81 MG/DL
GLUCOSE SERPL-MCNC: 112 MG/DL
HDLC SERPL-MCNC: 82 MG/DL
LDLC SERPL CALC-MCNC: 68 MG/DL
NONHDLC SERPL-MCNC: 89 MG/DL
POTASSIUM SERPL-SCNC: 4.2 MMOL/L
PROT SERPL-MCNC: 6.8 G/DL
SODIUM SERPL-SCNC: 141 MMOL/L
TRIGL SERPL-MCNC: 108 MG/DL

## 2022-01-22 PROCEDURE — 71046 X-RAY EXAM CHEST 2 VIEWS: CPT | Mod: 26

## 2022-01-22 PROCEDURE — 71046 X-RAY EXAM CHEST 2 VIEWS: CPT

## 2022-01-23 LAB
APPEARANCE: CLEAR
APTT BLD: 30.8 SEC
BACTERIA: NEGATIVE
BASOPHILS # BLD AUTO: 0.03 K/UL
BASOPHILS NFR BLD AUTO: 0.3 %
BILIRUBIN URINE: NEGATIVE
BLOOD URINE: NEGATIVE
COLOR: NORMAL
EOSINOPHIL # BLD AUTO: 0 K/UL
EOSINOPHIL NFR BLD AUTO: 0 %
GLUCOSE QUALITATIVE U: NEGATIVE
HCG SERPL-MCNC: 1 MIU/ML
HCT VFR BLD CALC: 39.7 %
HGB BLD-MCNC: 13.1 G/DL
HYALINE CASTS: 0 /LPF
IMM GRANULOCYTES NFR BLD AUTO: 0.4 %
INR PPP: 0.9 RATIO
KETONES URINE: NEGATIVE
LEUKOCYTE ESTERASE URINE: ABNORMAL
LYMPHOCYTES # BLD AUTO: 1.58 K/UL
LYMPHOCYTES NFR BLD AUTO: 15.4 %
MAN DIFF?: NORMAL
MCHC RBC-ENTMCNC: 30.5 PG
MCHC RBC-ENTMCNC: 33 GM/DL
MCV RBC AUTO: 92.5 FL
MICROSCOPIC-UA: NORMAL
MONOCYTES # BLD AUTO: 0.96 K/UL
MONOCYTES NFR BLD AUTO: 9.4 %
NEUTROPHILS # BLD AUTO: 7.65 K/UL
NEUTROPHILS NFR BLD AUTO: 74.5 %
NITRITE URINE: NEGATIVE
PH URINE: 7
PLATELET # BLD AUTO: 245 K/UL
PROTEIN URINE: NEGATIVE
PT BLD: 10.8 SEC
RBC # BLD: 4.29 M/UL
RBC # FLD: 12.7 %
RED BLOOD CELLS URINE: 0 /HPF
SPECIFIC GRAVITY URINE: 1.01
SQUAMOUS EPITHELIAL CELLS: 1 /HPF
UROBILINOGEN URINE: NORMAL
WBC # FLD AUTO: 10.26 K/UL
WHITE BLOOD CELLS URINE: 3 /HPF

## 2022-01-24 ENCOUNTER — APPOINTMENT (OUTPATIENT)
Dept: INTERNAL MEDICINE | Facility: CLINIC | Age: 57
End: 2022-01-24
Payer: COMMERCIAL

## 2022-01-24 VITALS
DIASTOLIC BLOOD PRESSURE: 70 MMHG | HEART RATE: 102 BPM | OXYGEN SATURATION: 97 % | SYSTOLIC BLOOD PRESSURE: 110 MMHG | TEMPERATURE: 99.3 F | WEIGHT: 145 LBS | RESPIRATION RATE: 16 BRPM | HEIGHT: 70 IN | BODY MASS INDEX: 20.76 KG/M2

## 2022-01-24 DIAGNOSIS — J04.10 ACUTE TRACHEITIS W/OUT OBSTRUCTION: ICD-10-CM

## 2022-01-24 DIAGNOSIS — J06.9 ACUTE UPPER RESPIRATORY INFECTION, UNSPECIFIED: ICD-10-CM

## 2022-01-24 LAB — BACTERIA UR CULT: NORMAL

## 2022-01-24 PROCEDURE — 99203 OFFICE O/P NEW LOW 30 MIN: CPT

## 2022-01-24 RX ORDER — PREDNISONE 20 MG/1
20 TABLET ORAL
Qty: 19 | Refills: 0 | Status: ACTIVE | COMMUNITY
Start: 2022-01-24 | End: 1900-01-01

## 2022-01-24 RX ORDER — PROMETHAZINE HYDROCHLORIDE AND CODEINE PHOSPHATE 6.25; 1 MG/5ML; MG/5ML
6.25-1 SOLUTION ORAL
Qty: 240 | Refills: 0 | Status: DISCONTINUED | COMMUNITY
Start: 2022-01-24 | End: 2022-01-24

## 2022-01-24 RX ORDER — PROMETHAZINE HYDROCHLORIDE AND CODEINE PHOSPHATE 6.25; 1 MG/5ML; MG/5ML
6.25-1 SOLUTION ORAL
Qty: 210 | Refills: 0 | Status: ACTIVE | COMMUNITY
Start: 2022-01-24 | End: 1900-01-01

## 2022-01-24 RX ORDER — ZOSTER VACCINE RECOMBINANT, ADJUVANTED 50 MCG/0.5
50 KIT INTRAMUSCULAR
Qty: 1 | Refills: 0 | Status: DISCONTINUED | COMMUNITY
Start: 2021-09-01 | End: 2022-01-24

## 2022-01-24 RX ORDER — DOXYCYCLINE 100 MG/1
100 TABLET, FILM COATED ORAL
Qty: 14 | Refills: 0 | Status: ACTIVE | COMMUNITY
Start: 2022-01-24 | End: 1900-01-01

## 2022-01-24 RX ORDER — AZITHROMYCIN 250 MG/1
250 TABLET, FILM COATED ORAL
Qty: 1 | Refills: 1 | Status: DISCONTINUED | COMMUNITY
Start: 2022-01-24 | End: 2022-01-24

## 2022-01-24 NOTE — ASSESSMENT
[FreeTextEntry4] : She has a cough and hoarse voice and her exam is consistent with laryngotracheitis. We discussed that this is generally a viral infection and should not need antibiotics but in light of her upcoming surgery, if the cough has not improved by next week I agreed to call in an antibiotic to cover an atypical bacterial infection.\par \par She is having a chest X ray tomorrow at [facility]. Her EKG is normal. Labs were ordered as requested by Dr. Grimm's office.

## 2022-01-24 NOTE — ADDENDUM
[FreeTextEntry1] : Preop labs and chest X ray are within normal limits. She is optimized for surgery.

## 2022-01-24 NOTE — PHYSICAL EXAM
[No Acute Distress] : no acute distress [Well-Appearing] : well-appearing [Normal TMs] : both tympanic membranes were normal [Normal Nasal Mucosa] : the nasal mucosa was normal [No Lymphadenopathy] : no lymphadenopathy [Supple] : supple [No Respiratory Distress] : no respiratory distress  [No Accessory Muscle Use] : no accessory muscle use [Normal Rate] : normal rate  [Regular Rhythm] : with a regular rhythm [Normal S1, S2] : normal S1 and S2 [No Edema] : there was no peripheral edema [Soft] : abdomen soft [Non Tender] : non-tender [Non-distended] : non-distended [Normal Bowel Sounds] : normal bowel sounds [Normal Gait] : normal gait [Normal Affect] : the affect was normal [Alert and Oriented x3] : oriented to person, place, and time [de-identified] : Dry cough at time of exam, mildly diminished lung sounds B/L

## 2022-01-24 NOTE — PLAN
[FreeTextEntry1] : 1. Patient to start doxycycline BID x 7 days with prednisone taper as written\par \par 2. To take Promethazine-codeine as written for cough as needed\par \par 3. Rest advised- provided note for work\par \par 4. Maintain hydration, frequent warm fluids  \par \par All questions answered. Agrees with plan above.

## 2022-01-24 NOTE — HISTORY OF PRESENT ILLNESS
[No Pertinent Cardiac History] : no history of aortic stenosis, atrial fibrillation, coronary artery disease, recent myocardial infarction, or implantable device/pacemaker [No Pertinent Pulmonary History] : no history of asthma, COPD, sleep apnea, or smoking [No Adverse Anesthesia Reaction] : no adverse anesthesia reaction in self or family member [(Patient denies any chest pain, claudication, dyspnea on exertion, orthopnea, palpitations or syncope)] : Patient denies any chest pain, claudication, dyspnea on exertion, orthopnea, palpitations or syncope [Chronic Anticoagulation] : no chronic anticoagulation [Chronic Kidney Disease] : no chronic kidney disease [Diabetes] : no diabetes [FreeTextEntry1] : umbilical hernia repair [FreeTextEntry2] : 2/8/22 [FreeTextEntry3] : Dr. Grimm [FreeTextEntry4] : She has a history of umbilical hernia repair 4 years ago. The hernia has recurred so she is returning for another hernia repair.\par \par She also has a recent cough. She is vaccinated for COVID and does not think that her cough is due to COVID but she developed laryngitis a few days ago and now has a cough productive of green mucus.

## 2022-01-24 NOTE — PHYSICAL EXAM
[Grossly Normal Strength/Tone] : grossly normal strength/tone [No Focal Deficits] : no focal deficits [Normal] : affect was normal and insight and judgment were intact [de-identified] : lungs are clear but she has slight stridor with cough, consistent with tracheitis

## 2022-01-24 NOTE — HISTORY OF PRESENT ILLNESS
[[id number]] : Patient is a 56 year old patient with PMH of abdominal hernia, umbilical hernia, migraine, S/p breast cancer 2010 with lumpectomy, S/p COVID 12/10/21 with MAB infusion 12/14/21 who presents to our office to establish care. Patient reports she has been having lingering cough since COVID that initially improved but worsened Tuesday of last week. Describes it as a "bark like" cough at night. She reports she is now experiencing loss of voice, dry persistent cough, and occasional shortness of breath. She reports yellow/green expectoration. 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 Patient was referred for CXR by PCP in which was reviewed with patient and noted to be negative for acute process. \par \par Patient is vaccinated x 2 with Moderna. She denies significant pulmonary history including Asthma, COPD, emphysema. She denies recent hospitalizations for any reason in the last year.

## 2022-01-25 ENCOUNTER — NON-APPOINTMENT (OUTPATIENT)
Age: 57
End: 2022-01-25

## 2022-02-02 ENCOUNTER — TRANSCRIPTION ENCOUNTER (OUTPATIENT)
Age: 57
End: 2022-02-02

## 2022-02-08 ENCOUNTER — RESULT REVIEW (OUTPATIENT)
Age: 57
End: 2022-02-08

## 2022-02-15 ENCOUNTER — RX RENEWAL (OUTPATIENT)
Age: 57
End: 2022-02-15

## 2022-05-11 ENCOUNTER — FORM ENCOUNTER (OUTPATIENT)
Age: 57
End: 2022-05-11

## 2022-05-25 ENCOUNTER — FORM ENCOUNTER (OUTPATIENT)
Age: 57
End: 2022-05-25

## 2022-06-07 ENCOUNTER — FORM ENCOUNTER (OUTPATIENT)
Age: 57
End: 2022-06-07

## 2022-10-17 RX ORDER — SUMATRIPTAN 100 MG/1
100 TABLET, FILM COATED ORAL
Qty: 20 | Refills: 3 | Status: ACTIVE | COMMUNITY
Start: 2021-06-14 | End: 1900-01-01

## 2022-11-21 ENCOUNTER — OFFICE (OUTPATIENT)
Dept: URBAN - METROPOLITAN AREA CLINIC 102 | Facility: CLINIC | Age: 57
Setting detail: OPHTHALMOLOGY
End: 2022-11-21
Payer: COMMERCIAL

## 2022-11-21 ENCOUNTER — RX ONLY (RX ONLY)
Age: 57
End: 2022-11-21

## 2022-11-21 DIAGNOSIS — H11.121: ICD-10-CM

## 2022-11-21 DIAGNOSIS — H16.223: ICD-10-CM

## 2022-11-21 PROBLEM — H40.013 GLAUCOMA SUSPECT, LOW RISK 1-2 FACTORS; RIGHT EYE, LEFT EYE, BOTH EYES: Status: ACTIVE | Noted: 2022-11-21

## 2022-11-21 PROBLEM — H40.012 GLAUCOMA SUSPECT, LOW RISK 1-2 FACTORS; RIGHT EYE, LEFT EYE, BOTH EYES: Status: ACTIVE | Noted: 2022-11-21

## 2022-11-21 PROBLEM — H40.011 GLAUCOMA SUSPECT, LOW RISK 1-2 FACTORS; RIGHT EYE, LEFT EYE, BOTH EYES: Status: ACTIVE | Noted: 2022-11-21

## 2022-11-21 PROCEDURE — 92014 COMPRE OPH EXAM EST PT 1/>: CPT | Performed by: OPHTHALMOLOGY

## 2022-11-21 PROCEDURE — 67938 REMOVE EYELID FOREIGN BODY: CPT | Performed by: OPHTHALMOLOGY

## 2022-11-21 ASSESSMENT — REFRACTION_CURRENTRX
OS_CYLINDER: -0.25
OS_VPRISM_DIRECTION: PROGS
OS_SPHERE: +3.25
OD_SPHERE: +2.50
OD_SPHERE: +0.50
OS_VPRISM_DIRECTION: SV
OS_OVR_VA: 20/
OS_CYLINDER: 0.00
OS_AXIS: 177
OS_ADD: +1.75
OD_AXIS: 180
OD_AXIS: 36
OD_OVR_VA: 20/
OS_OVR_VA: 20/
OD_ADD: +1.75
OD_CYLINDER: -0.25
OD_OVR_VA: 20/
OD_VPRISM_DIRECTION: PROGS
OD_VPRISM_DIRECTION: SV
OS_AXIS: 180
OD_CYLINDER: 0.00
OS_SPHERE: +1.50

## 2022-11-21 ASSESSMENT — REFRACTION_MANIFEST
OS_AXIS: 73
OS_CYLINDER: -0.25
OS_VA1: 20/20
OD_AXIS: 83
OD_CYLINDER: -0.75
OD_VA1: 20/20
OS_ADD: +2.25
OS_CYLINDER: -0.25
OD_CYLINDER: -0.50
OS_SPHERE: +1.25
OD_VA1: 20/20
OD_SPHERE: +1.75
OD_AXIS: 085
OD_SPHERE: +1.75
OS_AXIS: 070
OD_AXIS: 085
OD_ADD: +2.25
OS_SPHERE: +1.75
OD_VA1: 20/20
OS_VA1: 20/20
OD_ADD: +2.25
OS_VA1: 20/20-2
OD_CYLINDER: -0.50
OS_SPHERE: +1.25
OS_ADD: +2.25
OS_CYLINDER: -0.25
OD_SPHERE: +1.25
OS_AXIS: 070

## 2022-11-21 ASSESSMENT — AXIALLENGTH_DERIVED
OD_AL: 22.7623
OS_AL: 23.1174
OS_AL: 22.886
OD_AL: 22.6719
OS_AL: 23.1174
OD_AL: 22.8077
OD_AL: 22.9451
OS_AL: 22.9319

## 2022-11-21 ASSESSMENT — SPHEQUIV_DERIVED
OD_SPHEQUIV: 1
OD_SPHEQUIV: 1.375
OS_SPHEQUIV: 1.125
OD_SPHEQUIV: 1.75
OD_SPHEQUIV: 1.5
OS_SPHEQUIV: 1.125
OS_SPHEQUIV: 1.625
OS_SPHEQUIV: 1.75

## 2022-11-21 ASSESSMENT — KERATOMETRY
OS_K2POWER_DIOPTERS: 43.75
OS_K1POWER_DIOPTERS: 43.50
OD_AXISANGLE_DEGREES: 011
METHOD_AUTO_MANUAL: AUTO
OS_AXISANGLE_DEGREES: 089
OD_K2POWER_DIOPTERS: 44.50
OD_K1POWER_DIOPTERS: 44.00

## 2022-11-21 ASSESSMENT — VISUAL ACUITY
OD_BCVA: 20/20
OS_BCVA: 20/20

## 2022-11-21 ASSESSMENT — REFRACTION_AUTOREFRACTION
OD_CYLINDER: -1.00
OS_CYLINDER: -0.50
OS_SPHERE: +2.00
OS_AXIS: 082
OD_AXIS: 097
OD_SPHERE: +2.25

## 2022-11-21 ASSESSMENT — DECREASING TEAR LAKE - SEVERITY SCORE
OD_DEC_TEARLAKE: T
OS_DEC_TEARLAKE: T

## 2022-11-21 ASSESSMENT — TONOMETRY
OD_IOP_MMHG: 18
OS_IOP_MMHG: 18
OS_IOP_MMHG: 17
OD_IOP_MMHG: 17

## 2022-11-21 ASSESSMENT — CONFRONTATIONAL VISUAL FIELD TEST (CVF)
OD_FINDINGS: FULL
OS_FINDINGS: FULL

## 2023-04-11 NOTE — ED STATDOCS - NSICDXFAMILYHX_GEN_ALL_CORE_FT
How Severe Is It?: moderate
Is This A New Presentation, Or A Follow-Up?: Follow Up Isotretinoin
FAMILY HISTORY:  No pertinent family history in first degree relatives

## 2023-05-24 NOTE — ED ADULT NURSE NOTE - OBJECTIVE STATEMENT
Unknown
pt presents to the ED c/o RLQ abd pain beginning today afternoon. +N/V/D. Reports that stool is very watery. No other acute complaints.

## 2024-08-23 NOTE — ED ADULT NURSE NOTE - FINAL NURSING ELECTRONIC SIGNATURE
Called pt back, offered her 9/16 at [facility], 9/17 at [facility] and pt decided to keep 9/20 at [facility]   
Patient called wanting to know if her scheduled injection on 9/20/24 is the soonest available. Please advise.  
25-Dec-2019 00:14